# Patient Record
Sex: FEMALE | Race: WHITE
[De-identification: names, ages, dates, MRNs, and addresses within clinical notes are randomized per-mention and may not be internally consistent; named-entity substitution may affect disease eponyms.]

---

## 2020-06-27 ENCOUNTER — HOSPITAL ENCOUNTER (EMERGENCY)
Dept: HOSPITAL 26 - MED | Age: 80
LOS: 1 days | Discharge: HOME | End: 2020-06-28
Payer: MEDICARE

## 2020-06-27 VITALS — WEIGHT: 132 LBS | HEIGHT: 62 IN | BODY MASS INDEX: 24.29 KG/M2

## 2020-06-27 VITALS — DIASTOLIC BLOOD PRESSURE: 98 MMHG | SYSTOLIC BLOOD PRESSURE: 162 MMHG

## 2020-06-27 DIAGNOSIS — Z88.5: ICD-10-CM

## 2020-06-27 DIAGNOSIS — Z88.8: ICD-10-CM

## 2020-06-27 DIAGNOSIS — Z79.899: ICD-10-CM

## 2020-06-27 DIAGNOSIS — Z79.82: ICD-10-CM

## 2020-06-27 DIAGNOSIS — J44.9: ICD-10-CM

## 2020-06-27 DIAGNOSIS — Z86.73: ICD-10-CM

## 2020-06-27 DIAGNOSIS — I10: Primary | ICD-10-CM

## 2020-06-27 DIAGNOSIS — Z88.6: ICD-10-CM

## 2020-06-28 VITALS — SYSTOLIC BLOOD PRESSURE: 137 MMHG | DIASTOLIC BLOOD PRESSURE: 84 MMHG

## 2020-06-28 NOTE — NUR
PT'S GRANDSONS' GOT INTO AN ARGUMENT LAST NIGHT, SHE BECAME UPSET AND STARTED 
FEELING DIZZY AND HAD A HEADACHE.  IT WAS GONE THIS A.M. HOWEVER AROUND 1 
O'CLOCK P.M. SHE STARTED FEELING DIZZY AGAIN.  DENIES ANY WEAKNESS TO 
EXTREMITES, AMBULATES WITH STEADY GAIT.  SHE WAS CONCERNED THAT HER B/P WAS 
ELEVATED.  BED IN LOWEST POSITION AND SIDERAIL UP X 1



NKA

MED HX - HTN, COPD

## 2020-06-28 NOTE — NUR
Patient discharged with v/s stable. Written and verbal after care instructions 
given and explained by Susanne KEANE. Patient verbalized understanding. Ambulatory 
with steady gait. All questions addressed prior to discharge. Advised to follow 
up with PMD 1-2 days. Return back to any nearest ER if symptoms worsens.

## 2024-08-15 ENCOUNTER — HOSPITAL ENCOUNTER (EMERGENCY)
Dept: HOSPITAL 26 - MED | Age: 84
Discharge: HOME | End: 2024-08-15
Payer: MEDICARE

## 2024-08-15 VITALS — BODY MASS INDEX: 20.58 KG/M2 | HEIGHT: 61 IN | WEIGHT: 109 LBS

## 2024-08-15 VITALS
TEMPERATURE: 98.1 F | HEART RATE: 92 BPM | SYSTOLIC BLOOD PRESSURE: 173 MMHG | DIASTOLIC BLOOD PRESSURE: 83 MMHG | RESPIRATION RATE: 20 BRPM | OXYGEN SATURATION: 95 %

## 2024-08-15 DIAGNOSIS — J44.9: ICD-10-CM

## 2024-08-15 DIAGNOSIS — Z79.899: ICD-10-CM

## 2024-08-15 DIAGNOSIS — Z88.0: ICD-10-CM

## 2024-08-15 DIAGNOSIS — Z91.040: ICD-10-CM

## 2024-08-15 DIAGNOSIS — Z98.890: ICD-10-CM

## 2024-08-15 DIAGNOSIS — Z88.5: ICD-10-CM

## 2024-08-15 DIAGNOSIS — Z88.6: ICD-10-CM

## 2024-08-15 DIAGNOSIS — Z86.73: ICD-10-CM

## 2024-08-15 DIAGNOSIS — F41.9: Primary | ICD-10-CM

## 2024-08-15 DIAGNOSIS — I10: ICD-10-CM
